# Patient Record
Sex: FEMALE | Race: WHITE
[De-identification: names, ages, dates, MRNs, and addresses within clinical notes are randomized per-mention and may not be internally consistent; named-entity substitution may affect disease eponyms.]

---

## 2017-11-05 ENCOUNTER — HOSPITAL ENCOUNTER (EMERGENCY)
Dept: HOSPITAL 26 - MED | Age: 62
Discharge: HOME | End: 2017-11-05
Payer: COMMERCIAL

## 2017-11-05 VITALS — SYSTOLIC BLOOD PRESSURE: 167 MMHG | DIASTOLIC BLOOD PRESSURE: 77 MMHG

## 2017-11-05 VITALS — SYSTOLIC BLOOD PRESSURE: 149 MMHG | DIASTOLIC BLOOD PRESSURE: 78 MMHG

## 2017-11-05 VITALS — HEIGHT: 65 IN | WEIGHT: 170 LBS | BODY MASS INDEX: 28.32 KG/M2

## 2017-11-05 DIAGNOSIS — Z79.899: ICD-10-CM

## 2017-11-05 DIAGNOSIS — I10: ICD-10-CM

## 2017-11-05 DIAGNOSIS — M54.5: Primary | ICD-10-CM

## 2017-11-05 DIAGNOSIS — M06.80: ICD-10-CM

## 2017-11-05 PROCEDURE — 99284 EMERGENCY DEPT VISIT MOD MDM: CPT

## 2017-11-05 PROCEDURE — 72100 X-RAY EXAM L-S SPINE 2/3 VWS: CPT

## 2017-11-05 PROCEDURE — 96372 THER/PROPH/DIAG INJ SC/IM: CPT

## 2017-11-05 PROCEDURE — 81025 URINE PREGNANCY TEST: CPT

## 2017-11-05 PROCEDURE — 81002 URINALYSIS NONAUTO W/O SCOPE: CPT

## 2017-11-05 NOTE — NUR
61 Y/O F W/C/O R LOWER BACK PAIN SINCE 2 DAYS AGO. PT DENIES ANY INJURY, OR 
PAINFUL URINATION BUT STATES SHE HAS NOTED FREQUENT URINATION AS WELL. NO OTHER 
S/S OF DISTRESS NOTED AT THE MOMENT. ER MD MADE AWARE.

## 2017-11-05 NOTE — NUR
Patient discharged with v/s stable. Written and verbal after care instructions 
given and explained. Patient alert, oriented and verbalized understanding of 
instructions. Ambulatory with steady gait. All questions addressed prior to 
discharge. ID band removed. Patient advised to follow up with PMD OR RETURN TO 
ER IF CONDITION WORSENS. Rx of  MOTRIN 800MG given. Patient educated on 
indication of medication including possible reaction and side effects. 
Opportunity to ask questions provided and answered.

## 2018-04-18 ENCOUNTER — HOSPITAL ENCOUNTER (EMERGENCY)
Dept: HOSPITAL 1 - ED | Age: 63
Discharge: HOME | End: 2018-04-18
Payer: COMMERCIAL

## 2018-04-18 VITALS
HEIGHT: 60 IN | WEIGHT: 176.99 LBS | WEIGHT: 176.99 LBS | BODY MASS INDEX: 34.75 KG/M2 | HEIGHT: 60 IN | BODY MASS INDEX: 34.75 KG/M2

## 2018-04-18 VITALS — DIASTOLIC BLOOD PRESSURE: 64 MMHG | SYSTOLIC BLOOD PRESSURE: 130 MMHG

## 2018-04-18 DIAGNOSIS — Z88.5: ICD-10-CM

## 2018-04-18 DIAGNOSIS — R10.11: ICD-10-CM

## 2018-04-18 DIAGNOSIS — G89.29: Primary | ICD-10-CM

## 2018-04-18 DIAGNOSIS — R35.0: ICD-10-CM

## 2018-04-18 DIAGNOSIS — I10: ICD-10-CM

## 2018-04-18 DIAGNOSIS — R10.31: ICD-10-CM

## 2018-04-18 LAB
ALBUMIN SERPL-MCNC: 3.5 G/DL (ref 3.4–5)
ALP SERPL-CCNC: 93 U/L (ref 46–116)
ALT SERPL-CCNC: 25 U/L (ref 14–59)
AST SERPL-CCNC: 11 U/L (ref 15–37)
BASOPHILS NFR BLD: 0.3 % (ref 0–2)
BILIRUB SERPL-MCNC: 0.3 MG/DL (ref 0.2–1)
BUN SERPL-MCNC: 13 MG/DL (ref 7–18)
CALCIUM SERPL-MCNC: 9.3 MG/DL (ref 8.5–10.1)
CHLORIDE SERPL-SCNC: 101 MMOL/L (ref 98–107)
CO2 SERPL-SCNC: 30.9 MMOL/L (ref 21–32)
CREAT SERPL-MCNC: 0.8 MG/DL (ref 0.6–1)
ERYTHROCYTE [DISTWIDTH] IN BLOOD BY AUTOMATED COUNT: 14.2 % (ref 11.5–14.5)
GFR SERPLBLD BASED ON 1.73 SQ M-ARVRAT: > 60 ML/MIN
GLUCOSE SERPL-MCNC: 103 MG/DL (ref 74–106)
MICROSCOPIC UR-IMP: YES
PLATELET # BLD: 264 X10^3MCL (ref 130–400)
POTASSIUM SERPL-SCNC: 4 MMOL/L (ref 3.5–5.1)
PROT SERPL-MCNC: 8.5 G/DL (ref 6.4–8.2)
RBC # UR STRIP.AUTO: (no result) /UL
SODIUM SERPL-SCNC: 137 MMOL/L (ref 136–145)
UA SPECIFIC GRAVITY: 1.01 (ref 1–1.03)

## 2018-07-02 ENCOUNTER — HOSPITAL ENCOUNTER (INPATIENT)
Dept: HOSPITAL 1 - ED | Age: 63
LOS: 2 days | Discharge: HOME | DRG: 392 | End: 2018-07-04
Attending: INTERNAL MEDICINE | Admitting: INTERNAL MEDICINE
Payer: COMMERCIAL

## 2018-07-02 VITALS — SYSTOLIC BLOOD PRESSURE: 175 MMHG | DIASTOLIC BLOOD PRESSURE: 73 MMHG

## 2018-07-02 VITALS
BODY MASS INDEX: 35.49 KG/M2 | HEIGHT: 60 IN | BODY MASS INDEX: 35.49 KG/M2 | HEIGHT: 60 IN | WEIGHT: 180.78 LBS | WEIGHT: 180.78 LBS

## 2018-07-02 DIAGNOSIS — E11.65: ICD-10-CM

## 2018-07-02 DIAGNOSIS — E78.5: ICD-10-CM

## 2018-07-02 DIAGNOSIS — Z79.84: ICD-10-CM

## 2018-07-02 DIAGNOSIS — K21.9: Primary | ICD-10-CM

## 2018-07-02 DIAGNOSIS — E44.1: ICD-10-CM

## 2018-07-02 DIAGNOSIS — M06.9: ICD-10-CM

## 2018-07-02 DIAGNOSIS — E83.39: ICD-10-CM

## 2018-07-02 DIAGNOSIS — E87.6: ICD-10-CM

## 2018-07-02 DIAGNOSIS — I16.0: ICD-10-CM

## 2018-07-02 DIAGNOSIS — H10.13: ICD-10-CM

## 2018-07-02 LAB
ALBUMIN SERPL-MCNC: 3.3 G/DL (ref 3.4–5)
ALP SERPL-CCNC: 98 U/L (ref 46–116)
ALT SERPL-CCNC: 24 U/L (ref 14–59)
AMYLASE SERPL-CCNC: 56 U/L (ref 25–115)
AST SERPL-CCNC: 16 U/L (ref 15–37)
BASOPHILS NFR BLD: 0.7 % (ref 0–2)
BILIRUB SERPL-MCNC: 0.19 MG/DL (ref 0.2–1)
BUN SERPL-MCNC: 11 MG/DL (ref 7–18)
CALCIUM SERPL-MCNC: 8.8 MG/DL (ref 8.5–10.1)
CHLORIDE SERPL-SCNC: 104 MMOL/L (ref 98–107)
CHOLEST SERPL-MCNC: 205 MG/DL (ref ?–200)
CHOLEST/HDLC SERPL: 5.3 MG/DL
CO2 SERPL-SCNC: 27.9 MMOL/L (ref 21–32)
CREAT SERPL-MCNC: 0.6 MG/DL (ref 0.6–1)
ERYTHROCYTE [DISTWIDTH] IN BLOOD BY AUTOMATED COUNT: 14 % (ref 11.5–14.5)
GFR SERPLBLD BASED ON 1.73 SQ M-ARVRAT: > 60 ML/MIN
GLUCOSE SERPL-MCNC: 92 MG/DL (ref 74–106)
HDLC SERPL-MCNC: 39 MG/DL (ref 40–60)
LIPASE SERPL-CCNC: 242 IU/L (ref 73–393)
MAGNESIUM SERPL-MCNC: 2.2 MG/DL (ref 1.8–2.4)
MICROSCOPIC UR-IMP: YES
PHOSPHATE SERPL-MCNC: 3.4 MG/DL (ref 2.5–4.9)
PLATELET # BLD: 279 X10^3MCL (ref 130–400)
POTASSIUM SERPL-SCNC: 3.5 MMOL/L (ref 3.5–5.1)
PROT SERPL-MCNC: 8.5 G/DL (ref 6.4–8.2)
RBC # UR STRIP.AUTO: (no result) /UL
SODIUM SERPL-SCNC: 139 MMOL/L (ref 136–145)
T3 SERPL-MCNC: 1.09 NG/ML
T3RU NFR SERPL: 33 % UPTAKE (ref 30–39)
T4 FREE SERPL-MCNC: 1.01 NG/DL (ref 0.76–1.46)
T4 SERPL-MCNC: 6.7 UG/DL (ref 4.7–13.3)
T4/T3 UPTAKE INDEX SERPL: 2.2 UG/DL (ref 1.4–4.5)
TRIGL SERPL-MCNC: 476 MG/DL (ref ?–150)
UA SPECIFIC GRAVITY: 1.01 (ref 1–1.03)

## 2018-07-02 PROCEDURE — C9113 INJ PANTOPRAZOLE SODIUM, VIA: HCPCS

## 2018-07-03 VITALS — SYSTOLIC BLOOD PRESSURE: 151 MMHG | DIASTOLIC BLOOD PRESSURE: 70 MMHG

## 2018-07-03 VITALS — DIASTOLIC BLOOD PRESSURE: 71 MMHG | SYSTOLIC BLOOD PRESSURE: 161 MMHG

## 2018-07-03 VITALS — SYSTOLIC BLOOD PRESSURE: 139 MMHG | DIASTOLIC BLOOD PRESSURE: 67 MMHG

## 2018-07-03 VITALS — DIASTOLIC BLOOD PRESSURE: 77 MMHG | SYSTOLIC BLOOD PRESSURE: 146 MMHG

## 2018-07-03 VITALS — DIASTOLIC BLOOD PRESSURE: 60 MMHG | SYSTOLIC BLOOD PRESSURE: 132 MMHG

## 2018-07-03 LAB
BASOPHILS NFR BLD: 0.5 % (ref 0–2)
BUN SERPL-MCNC: 7 MG/DL (ref 7–18)
CALCIUM SERPL-MCNC: 8.7 MG/DL (ref 8.5–10.1)
CHLORIDE SERPL-SCNC: 103 MMOL/L (ref 98–107)
CO2 SERPL-SCNC: 26.5 MMOL/L (ref 21–32)
CREAT SERPL-MCNC: 0.6 MG/DL (ref 0.6–1)
ERYTHROCYTE [DISTWIDTH] IN BLOOD BY AUTOMATED COUNT: 13.9 % (ref 11.5–14.5)
GFR SERPLBLD BASED ON 1.73 SQ M-ARVRAT: > 60 ML/MIN
GLUCOSE SERPL-MCNC: 168 MG/DL (ref 74–106)
MAGNESIUM SERPL-MCNC: 2 MG/DL (ref 1.8–2.4)
PHOSPHATE SERPL-MCNC: 2.3 MG/DL (ref 2.5–4.9)
PLATELET # BLD: 274 X10^3MCL (ref 130–400)
POTASSIUM SERPL-SCNC: 3.3 MMOL/L (ref 3.5–5.1)
SODIUM SERPL-SCNC: 141 MMOL/L (ref 136–145)

## 2018-07-04 VITALS — DIASTOLIC BLOOD PRESSURE: 79 MMHG | SYSTOLIC BLOOD PRESSURE: 144 MMHG

## 2018-07-04 VITALS — SYSTOLIC BLOOD PRESSURE: 158 MMHG | DIASTOLIC BLOOD PRESSURE: 68 MMHG

## 2018-07-04 VITALS — DIASTOLIC BLOOD PRESSURE: 77 MMHG | SYSTOLIC BLOOD PRESSURE: 145 MMHG

## 2018-07-04 VITALS — SYSTOLIC BLOOD PRESSURE: 136 MMHG | DIASTOLIC BLOOD PRESSURE: 68 MMHG

## 2018-07-04 LAB
BASOPHILS NFR BLD: 0.5 % (ref 0–2)
BUN SERPL-MCNC: 10 MG/DL (ref 7–18)
CALCIUM SERPL-MCNC: 9.1 MG/DL (ref 8.5–10.1)
CHLORIDE SERPL-SCNC: 105 MMOL/L (ref 98–107)
CO2 SERPL-SCNC: 26.7 MMOL/L (ref 21–32)
CREAT SERPL-MCNC: 0.6 MG/DL (ref 0.6–1)
ERYTHROCYTE [DISTWIDTH] IN BLOOD BY AUTOMATED COUNT: 13.7 % (ref 11.5–14.5)
GFR SERPLBLD BASED ON 1.73 SQ M-ARVRAT: > 60 ML/MIN
GLUCOSE SERPL-MCNC: 107 MG/DL (ref 74–106)
MAGNESIUM SERPL-MCNC: 2 MG/DL (ref 1.8–2.4)
PHOSPHATE SERPL-MCNC: 4 MG/DL (ref 2.5–4.9)
PLATELET # BLD: 259 X10^3MCL (ref 130–400)
POTASSIUM SERPL-SCNC: 3.8 MMOL/L (ref 3.5–5.1)
SODIUM SERPL-SCNC: 141 MMOL/L (ref 136–145)

## 2020-02-05 ENCOUNTER — HOSPITAL ENCOUNTER (INPATIENT)
Dept: HOSPITAL 1 - ED | Age: 65
LOS: 2 days | Discharge: HOME | DRG: 305 | End: 2020-02-07
Attending: HOSPITALIST | Admitting: HOSPITALIST
Payer: COMMERCIAL

## 2020-02-05 VITALS
BODY MASS INDEX: 35.6 KG/M2 | BODY MASS INDEX: 35.6 KG/M2 | HEIGHT: 60 IN | HEIGHT: 60 IN | WEIGHT: 181.31 LBS | WEIGHT: 181.31 LBS

## 2020-02-05 VITALS — SYSTOLIC BLOOD PRESSURE: 198 MMHG | DIASTOLIC BLOOD PRESSURE: 78 MMHG

## 2020-02-05 DIAGNOSIS — E11.9: ICD-10-CM

## 2020-02-05 DIAGNOSIS — J06.9: ICD-10-CM

## 2020-02-05 DIAGNOSIS — E66.9: ICD-10-CM

## 2020-02-05 DIAGNOSIS — I16.0: Primary | ICD-10-CM

## 2020-02-05 DIAGNOSIS — K21.9: ICD-10-CM

## 2020-02-05 DIAGNOSIS — I10: ICD-10-CM

## 2020-02-05 DIAGNOSIS — Z79.84: ICD-10-CM

## 2020-02-05 DIAGNOSIS — E78.1: ICD-10-CM

## 2020-02-05 DIAGNOSIS — M06.9: ICD-10-CM

## 2020-02-05 LAB
ALBUMIN SERPL-MCNC: 3.5 G/DL (ref 3.4–5)
ALP SERPL-CCNC: 93 U/L (ref 46–116)
ALT SERPL-CCNC: 21 U/L (ref 14–59)
AMPHETAMINES UR QL SCN: (no result)
AST SERPL-CCNC: 14 U/L (ref 15–37)
BASOPHILS NFR BLD: 0.6 % (ref 0–2)
BILIRUB SERPL-MCNC: 0.25 MG/DL (ref 0.2–1)
BUN SERPL-MCNC: 11 MG/DL (ref 7–18)
CALCIUM SERPL-MCNC: 8.8 MG/DL (ref 8.5–10.1)
CHLORIDE SERPL-SCNC: 104 MMOL/L (ref 98–107)
CHOLEST SERPL-MCNC: 153 MG/DL (ref ?–200)
CHOLEST SERPL-MCNC: 154 MG/DL (ref ?–200)
CHOLEST/HDLC SERPL: 4.3 MG/DL
CO2 SERPL-SCNC: 31.7 MMOL/L (ref 21–32)
CREAT SERPL-MCNC: 0.6 MG/DL (ref 0.6–1)
ERYTHROCYTE [DISTWIDTH] IN BLOOD BY AUTOMATED COUNT: 14.3 % (ref 11.5–14.5)
GFR SERPLBLD BASED ON 1.73 SQ M-ARVRAT: > 60 ML/MIN
GLUCOSE SERPL-MCNC: 113 MG/DL (ref 74–106)
HDLC SERPL-MCNC: 36 MG/DL (ref 40–60)
HDLC SERPL-MCNC: 38 MG/DL (ref 40–60)
LIPASE SERPL-CCNC: 807 IU/L (ref 73–393)
MICROSCOPIC UR-IMP: NO
PLATELET # BLD: 304 X10^3MCL (ref 130–400)
POTASSIUM SERPL-SCNC: 4.2 MMOL/L (ref 3.5–5.1)
PROT SERPL-MCNC: 8.5 G/DL (ref 6.4–8.2)
RBC # UR STRIP.AUTO: NEGATIVE /UL
SODIUM SERPL-SCNC: 141 MMOL/L (ref 136–145)
T3 SERPL-MCNC: 1.03 NG/ML
T3RU NFR SERPL: 35 % UPTAKE (ref 30–39)
T4 FREE SERPL-MCNC: 1.13 NG/DL (ref 0.76–1.46)
T4 SERPL-MCNC: 8.1 UG/DL (ref 4.7–13.3)
T4/T3 UPTAKE INDEX SERPL: 2.8 UG/DL (ref 1.4–4.5)
TRIGL SERPL-MCNC: 297 MG/DL (ref ?–150)
UA SPECIFIC GRAVITY: 1.01 (ref 1–1.03)

## 2020-02-05 PROCEDURE — C9113 INJ PANTOPRAZOLE SODIUM, VIA: HCPCS

## 2020-02-05 PROCEDURE — G0378 HOSPITAL OBSERVATION PER HR: HCPCS

## 2020-02-06 VITALS — SYSTOLIC BLOOD PRESSURE: 100 MMHG | DIASTOLIC BLOOD PRESSURE: 45 MMHG

## 2020-02-06 VITALS — SYSTOLIC BLOOD PRESSURE: 152 MMHG | DIASTOLIC BLOOD PRESSURE: 67 MMHG

## 2020-02-06 VITALS — DIASTOLIC BLOOD PRESSURE: 61 MMHG | SYSTOLIC BLOOD PRESSURE: 135 MMHG

## 2020-02-06 VITALS — SYSTOLIC BLOOD PRESSURE: 210 MMHG | DIASTOLIC BLOOD PRESSURE: 84 MMHG

## 2020-02-06 VITALS — SYSTOLIC BLOOD PRESSURE: 155 MMHG | DIASTOLIC BLOOD PRESSURE: 68 MMHG

## 2020-02-06 VITALS — DIASTOLIC BLOOD PRESSURE: 52 MMHG | SYSTOLIC BLOOD PRESSURE: 111 MMHG

## 2020-02-06 VITALS — SYSTOLIC BLOOD PRESSURE: 167 MMHG | DIASTOLIC BLOOD PRESSURE: 76 MMHG

## 2020-02-06 VITALS — DIASTOLIC BLOOD PRESSURE: 77 MMHG | SYSTOLIC BLOOD PRESSURE: 184 MMHG

## 2020-02-06 LAB
BASOPHILS NFR BLD: 0.4 % (ref 0–2)
BUN SERPL-MCNC: 14 MG/DL (ref 7–18)
CALCIUM SERPL-MCNC: 8.4 MG/DL (ref 8.5–10.1)
CHLORIDE SERPL-SCNC: 103 MMOL/L (ref 98–107)
CO2 SERPL-SCNC: 26.4 MMOL/L (ref 21–32)
CREAT SERPL-MCNC: 0.7 MG/DL (ref 0.6–1)
ERYTHROCYTE [DISTWIDTH] IN BLOOD BY AUTOMATED COUNT: 14.5 % (ref 11.5–14.5)
GFR SERPLBLD BASED ON 1.73 SQ M-ARVRAT: > 60 ML/MIN
GLUCOSE SERPL-MCNC: 118 MG/DL (ref 74–106)
MAGNESIUM SERPL-MCNC: 2.2 MG/DL (ref 1.8–2.4)
PHOSPHATE SERPL-MCNC: 2.8 MG/DL (ref 2.5–4.9)
PLATELET # BLD: 285 X10^3MCL (ref 130–400)
POTASSIUM SERPL-SCNC: 3.8 MMOL/L (ref 3.5–5.1)
SODIUM SERPL-SCNC: 140 MMOL/L (ref 136–145)

## 2020-02-07 VITALS — DIASTOLIC BLOOD PRESSURE: 52 MMHG | SYSTOLIC BLOOD PRESSURE: 136 MMHG

## 2020-02-07 VITALS — DIASTOLIC BLOOD PRESSURE: 59 MMHG | SYSTOLIC BLOOD PRESSURE: 116 MMHG

## 2020-02-07 VITALS — DIASTOLIC BLOOD PRESSURE: 54 MMHG | SYSTOLIC BLOOD PRESSURE: 131 MMHG

## 2020-02-07 LAB
BASOPHILS NFR BLD: 0.3 % (ref 0–2)
BUN SERPL-MCNC: 21 MG/DL (ref 7–18)
CALCIUM SERPL-MCNC: 8.6 MG/DL (ref 8.5–10.1)
CHLORIDE SERPL-SCNC: 105 MMOL/L (ref 98–107)
CO2 SERPL-SCNC: 25 MMOL/L (ref 21–32)
CREAT SERPL-MCNC: 0.7 MG/DL (ref 0.6–1)
ERYTHROCYTE [DISTWIDTH] IN BLOOD BY AUTOMATED COUNT: 14.6 % (ref 11.5–14.5)
GFR SERPLBLD BASED ON 1.73 SQ M-ARVRAT: > 60 ML/MIN
GLUCOSE SERPL-MCNC: 107 MG/DL (ref 74–106)
MAGNESIUM SERPL-MCNC: 2.4 MG/DL (ref 1.8–2.4)
PHOSPHATE SERPL-MCNC: 3.8 MG/DL (ref 2.5–4.9)
PLATELET # BLD: 276 X10^3MCL (ref 130–400)
POTASSIUM SERPL-SCNC: 3.9 MMOL/L (ref 3.5–5.1)
SODIUM SERPL-SCNC: 140 MMOL/L (ref 136–145)